# Patient Record
Sex: MALE | Race: WHITE | Employment: OTHER | URBAN - METROPOLITAN AREA
[De-identification: names, ages, dates, MRNs, and addresses within clinical notes are randomized per-mention and may not be internally consistent; named-entity substitution may affect disease eponyms.]

---

## 2017-02-21 PROCEDURE — 87086 URINE CULTURE/COLONY COUNT: CPT | Performed by: INTERNAL MEDICINE

## 2019-01-29 PROBLEM — M48.061 LUMBAR FORAMINAL STENOSIS: Status: ACTIVE | Noted: 2019-01-29

## 2019-01-29 PROBLEM — Z98.890 HISTORY OF LUMBAR SURGERY: Status: ACTIVE | Noted: 2019-01-29

## 2019-04-11 RX ORDER — TURMERIC 400 MG
1 CAPSULE ORAL DAILY
COMMUNITY
End: 2019-04-16

## 2019-04-16 PROBLEM — Z98.890 HISTORY OF LUMBAR SURGERY: Status: RESOLVED | Noted: 2019-01-29 | Resolved: 2019-04-16

## 2019-04-16 PROCEDURE — 81003 URINALYSIS AUTO W/O SCOPE: CPT | Performed by: INTERNAL MEDICINE

## 2019-04-24 ENCOUNTER — ANESTHESIA EVENT (OUTPATIENT)
Dept: SURGERY | Facility: HOSPITAL | Age: 74
DRG: 455 | End: 2019-04-24
Payer: MEDICARE

## 2019-04-24 ENCOUNTER — APPOINTMENT (OUTPATIENT)
Dept: GENERAL RADIOLOGY | Facility: HOSPITAL | Age: 74
DRG: 455 | End: 2019-04-24
Attending: ORTHOPAEDIC SURGERY
Payer: MEDICARE

## 2019-04-24 ENCOUNTER — HOSPITAL ENCOUNTER (INPATIENT)
Facility: HOSPITAL | Age: 74
LOS: 2 days | Discharge: HOME | DRG: 455 | End: 2019-04-26
Attending: ORTHOPAEDIC SURGERY | Admitting: ORTHOPAEDIC SURGERY
Payer: MEDICARE

## 2019-04-24 ENCOUNTER — ANESTHESIA (OUTPATIENT)
Dept: SURGERY | Facility: HOSPITAL | Age: 74
DRG: 455 | End: 2019-04-24
Payer: MEDICARE

## 2019-04-24 DIAGNOSIS — M41.86 OTHER FORM OF SCOLIOSIS OF LUMBAR SPINE: Primary | ICD-10-CM

## 2019-04-24 PROCEDURE — 95938 SOMATOSENSORY TESTING: CPT | Performed by: ORTHOPAEDIC SURGERY

## 2019-04-24 PROCEDURE — 0SG10A0 FUSION OF 2 OR MORE LUMBAR VERTEBRAL JOINTS WITH INTERBODY FUSION DEVICE, ANTERIOR APPROACH, ANTERIOR COLUMN, OPEN APPROACH: ICD-10-PCS | Performed by: ORTHOPAEDIC SURGERY

## 2019-04-24 PROCEDURE — 85027 COMPLETE CBC AUTOMATED: CPT | Performed by: PHYSICIAN ASSISTANT

## 2019-04-24 PROCEDURE — 95861 NEEDLE EMG 2 EXTREMITIES: CPT | Performed by: ORTHOPAEDIC SURGERY

## 2019-04-24 PROCEDURE — 4A11X4G MONITORING OF PERIPHERAL NERVOUS ELECTRICAL ACTIVITY, INTRAOPERATIVE, EXTERNAL APPROACH: ICD-10-PCS | Performed by: ORTHOPAEDIC SURGERY

## 2019-04-24 PROCEDURE — 95940 IONM IN OPERATNG ROOM 15 MIN: CPT | Performed by: ORTHOPAEDIC SURGERY

## 2019-04-24 PROCEDURE — 0SG1071 FUSION OF 2 OR MORE LUMBAR VERTEBRAL JOINTS WITH AUTOLOGOUS TISSUE SUBSTITUTE, POSTERIOR APPROACH, POSTERIOR COLUMN, OPEN APPROACH: ICD-10-PCS | Performed by: ORTHOPAEDIC SURGERY

## 2019-04-24 PROCEDURE — 07DS3ZZ EXTRACTION OF VERTEBRAL BONE MARROW, PERCUTANEOUS APPROACH: ICD-10-PCS | Performed by: ORTHOPAEDIC SURGERY

## 2019-04-24 PROCEDURE — 76000 FLUOROSCOPY <1 HR PHYS/QHP: CPT | Performed by: ORTHOPAEDIC SURGERY

## 2019-04-24 PROCEDURE — 0QN00ZZ RELEASE LUMBAR VERTEBRA, OPEN APPROACH: ICD-10-PCS | Performed by: ORTHOPAEDIC SURGERY

## 2019-04-24 DEVICE — RELINE MAS RED SCRW 6.5X50 2C: Type: IMPLANTABLE DEVICE | Status: FUNCTIONAL

## 2019-04-24 DEVICE — 5.6-MODULUS XLW 10X22X60 15: Type: IMPLANTABLE DEVICE | Site: BACK | Status: FUNCTIONAL

## 2019-04-24 DEVICE — BONE GRAFT KIT 7510400 INFUSE MEDIUM
Type: IMPLANTABLE DEVICE | Site: BACK | Status: FUNCTIONAL
Brand: INFUSE® BONE GRAFT

## 2019-04-24 DEVICE — 5.6-MODULUS XLW 12X22X60 15: Type: IMPLANTABLE DEVICE | Site: BACK | Status: FUNCTIONAL

## 2019-04-24 DEVICE — RELINE MAS COCR ROD 5.5X300 ST: Type: IMPLANTABLE DEVICE | Status: FUNCTIONAL

## 2019-04-24 DEVICE — RELINE LCK SCRW 5.5 OPEN TULIP: Type: IMPLANTABLE DEVICE | Status: FUNCTIONAL

## 2019-04-24 DEVICE — OSTEOCEL PRO LARGE BULK BUY: Type: IMPLANTABLE DEVICE | Site: BACK | Status: FUNCTIONAL

## 2019-04-24 DEVICE — 5.6-MODULUS XLW 12X22X55 15: Type: IMPLANTABLE DEVICE | Site: BACK | Status: FUNCTIONAL

## 2019-04-24 DEVICE — RELINE MAS RED SCREW 7.5X50 2C: Type: IMPLANTABLE DEVICE | Status: FUNCTIONAL

## 2019-04-24 RX ORDER — DIPHENHYDRAMINE HYDROCHLORIDE 50 MG/ML
25 INJECTION INTRAMUSCULAR; INTRAVENOUS EVERY 4 HOURS PRN
Status: DISCONTINUED | OUTPATIENT
Start: 2019-04-24 | End: 2019-04-26

## 2019-04-24 RX ORDER — OXYCODONE HYDROCHLORIDE 10 MG/1
10 TABLET ORAL EVERY 4 HOURS PRN
Status: DISCONTINUED | OUTPATIENT
Start: 2019-04-24 | End: 2019-04-25

## 2019-04-24 RX ORDER — SODIUM CHLORIDE, SODIUM LACTATE, POTASSIUM CHLORIDE, CALCIUM CHLORIDE 600; 310; 30; 20 MG/100ML; MG/100ML; MG/100ML; MG/100ML
INJECTION, SOLUTION INTRAVENOUS CONTINUOUS
Status: DISCONTINUED | OUTPATIENT
Start: 2019-04-24 | End: 2019-04-24 | Stop reason: HOSPADM

## 2019-04-24 RX ORDER — MIDAZOLAM HYDROCHLORIDE 1 MG/ML
INJECTION INTRAMUSCULAR; INTRAVENOUS
Status: COMPLETED
Start: 2019-04-24 | End: 2019-04-24

## 2019-04-24 RX ORDER — LATANOPROST 50 UG/ML
1 SOLUTION/ DROPS OPHTHALMIC NIGHTLY
Status: DISCONTINUED | OUTPATIENT
Start: 2019-04-24 | End: 2019-04-26

## 2019-04-24 RX ORDER — MORPHINE SULFATE 4 MG/ML
4 INJECTION, SOLUTION INTRAMUSCULAR; INTRAVENOUS EVERY 2 HOUR PRN
Status: DISCONTINUED | OUTPATIENT
Start: 2019-04-24 | End: 2019-04-26

## 2019-04-24 RX ORDER — MORPHINE SULFATE 4 MG/ML
2 INJECTION, SOLUTION INTRAMUSCULAR; INTRAVENOUS EVERY 2 HOUR PRN
Status: DISCONTINUED | OUTPATIENT
Start: 2019-04-24 | End: 2019-04-26

## 2019-04-24 RX ORDER — DEXAMETHASONE SODIUM PHOSPHATE 4 MG/ML
4 VIAL (ML) INJECTION AS NEEDED
Status: DISCONTINUED | OUTPATIENT
Start: 2019-04-24 | End: 2019-04-24 | Stop reason: HOSPADM

## 2019-04-24 RX ORDER — BUPIVACAINE HYDROCHLORIDE AND EPINEPHRINE 5; 5 MG/ML; UG/ML
INJECTION, SOLUTION EPIDURAL; INTRACAUDAL; PERINEURAL AS NEEDED
Status: DISCONTINUED | OUTPATIENT
Start: 2019-04-24 | End: 2019-04-24 | Stop reason: HOSPADM

## 2019-04-24 RX ORDER — NALOXONE HYDROCHLORIDE 0.4 MG/ML
80 INJECTION, SOLUTION INTRAMUSCULAR; INTRAVENOUS; SUBCUTANEOUS AS NEEDED
Status: DISCONTINUED | OUTPATIENT
Start: 2019-04-24 | End: 2019-04-24 | Stop reason: HOSPADM

## 2019-04-24 RX ORDER — MEPERIDINE HYDROCHLORIDE 25 MG/ML
12.5 INJECTION INTRAMUSCULAR; INTRAVENOUS; SUBCUTANEOUS AS NEEDED
Status: DISCONTINUED | OUTPATIENT
Start: 2019-04-24 | End: 2019-04-24 | Stop reason: HOSPADM

## 2019-04-24 RX ORDER — DIAZEPAM 5 MG/1
5 TABLET ORAL EVERY 6 HOURS PRN
Status: DISCONTINUED | OUTPATIENT
Start: 2019-04-24 | End: 2019-04-26

## 2019-04-24 RX ORDER — LATANOPROST 50 UG/ML
1 SOLUTION/ DROPS OPHTHALMIC NIGHTLY
COMMUNITY
Start: 2018-09-10 | End: 2020-09-30

## 2019-04-24 RX ORDER — ACETAMINOPHEN 500 MG
1000 TABLET ORAL ONCE
Status: DISCONTINUED | OUTPATIENT
Start: 2019-04-24 | End: 2019-04-24 | Stop reason: HOSPADM

## 2019-04-24 RX ORDER — DIPHENHYDRAMINE HCL 25 MG
25 CAPSULE ORAL EVERY 4 HOURS PRN
Status: DISCONTINUED | OUTPATIENT
Start: 2019-04-24 | End: 2019-04-26

## 2019-04-24 RX ORDER — DEXAMETHASONE SODIUM PHOSPHATE 10 MG/ML
10 INJECTION, SOLUTION INTRAMUSCULAR; INTRAVENOUS EVERY 8 HOURS
Status: COMPLETED | OUTPATIENT
Start: 2019-04-24 | End: 2019-04-25

## 2019-04-24 RX ORDER — MORPHINE SULFATE 15 MG/1
15 TABLET ORAL EVERY 4 HOURS PRN
Status: DISCONTINUED | OUTPATIENT
Start: 2019-04-24 | End: 2019-04-25

## 2019-04-24 RX ORDER — PANTOPRAZOLE SODIUM 40 MG/1
40 TABLET, DELAYED RELEASE ORAL
Status: DISCONTINUED | OUTPATIENT
Start: 2019-04-25 | End: 2019-04-26

## 2019-04-24 RX ORDER — POLYETHYLENE GLYCOL 3350 17 G/17G
17 POWDER, FOR SOLUTION ORAL DAILY PRN
Status: DISCONTINUED | OUTPATIENT
Start: 2019-04-24 | End: 2019-04-26

## 2019-04-24 RX ORDER — ONDANSETRON 2 MG/ML
4 INJECTION INTRAMUSCULAR; INTRAVENOUS EVERY 4 HOURS PRN
Status: ACTIVE | OUTPATIENT
Start: 2019-04-24 | End: 2019-04-25

## 2019-04-24 RX ORDER — MORPHINE SULFATE 4 MG/ML
1 INJECTION, SOLUTION INTRAMUSCULAR; INTRAVENOUS EVERY 2 HOUR PRN
Status: DISCONTINUED | OUTPATIENT
Start: 2019-04-24 | End: 2019-04-26

## 2019-04-24 RX ORDER — MIDAZOLAM HYDROCHLORIDE 1 MG/ML
1 INJECTION INTRAMUSCULAR; INTRAVENOUS EVERY 5 MIN PRN
Status: DISCONTINUED | OUTPATIENT
Start: 2019-04-24 | End: 2019-04-24 | Stop reason: HOSPADM

## 2019-04-24 RX ORDER — OXYCODONE HYDROCHLORIDE 5 MG/1
5 TABLET ORAL EVERY 4 HOURS PRN
Status: DISCONTINUED | OUTPATIENT
Start: 2019-04-24 | End: 2019-04-25

## 2019-04-24 RX ORDER — METOCLOPRAMIDE HYDROCHLORIDE 5 MG/ML
10 INJECTION INTRAMUSCULAR; INTRAVENOUS EVERY 6 HOURS PRN
Status: DISCONTINUED | OUTPATIENT
Start: 2019-04-24 | End: 2019-04-26

## 2019-04-24 RX ORDER — BISACODYL 10 MG
10 SUPPOSITORY, RECTAL RECTAL
Status: DISCONTINUED | OUTPATIENT
Start: 2019-04-24 | End: 2019-04-26

## 2019-04-24 RX ORDER — ONDANSETRON 2 MG/ML
4 INJECTION INTRAMUSCULAR; INTRAVENOUS AS NEEDED
Status: DISCONTINUED | OUTPATIENT
Start: 2019-04-24 | End: 2019-04-24 | Stop reason: HOSPADM

## 2019-04-24 RX ORDER — TIZANIDINE 2 MG/1
2 TABLET ORAL 3 TIMES DAILY PRN
Status: DISCONTINUED | OUTPATIENT
Start: 2019-04-24 | End: 2019-04-26

## 2019-04-24 RX ORDER — SODIUM CHLORIDE, SODIUM LACTATE, POTASSIUM CHLORIDE, CALCIUM CHLORIDE 600; 310; 30; 20 MG/100ML; MG/100ML; MG/100ML; MG/100ML
INJECTION, SOLUTION INTRAVENOUS CONTINUOUS
Status: DISCONTINUED | OUTPATIENT
Start: 2019-04-24 | End: 2019-04-24

## 2019-04-24 RX ORDER — ONDANSETRON 2 MG/ML
4 INJECTION INTRAMUSCULAR; INTRAVENOUS ONCE
Status: COMPLETED | OUTPATIENT
Start: 2019-04-24 | End: 2019-04-24

## 2019-04-24 RX ORDER — HYDROMORPHONE HYDROCHLORIDE 1 MG/ML
INJECTION, SOLUTION INTRAMUSCULAR; INTRAVENOUS; SUBCUTANEOUS
Status: COMPLETED
Start: 2019-04-24 | End: 2019-04-24

## 2019-04-24 RX ORDER — BACITRACIN 50000 [USP'U]/1
INJECTION, POWDER, LYOPHILIZED, FOR SOLUTION INTRAMUSCULAR AS NEEDED
Status: DISCONTINUED | OUTPATIENT
Start: 2019-04-24 | End: 2019-04-24 | Stop reason: HOSPADM

## 2019-04-24 RX ORDER — HYDROMORPHONE HYDROCHLORIDE 1 MG/ML
0.4 INJECTION, SOLUTION INTRAMUSCULAR; INTRAVENOUS; SUBCUTANEOUS EVERY 5 MIN PRN
Status: DISCONTINUED | OUTPATIENT
Start: 2019-04-24 | End: 2019-04-24 | Stop reason: HOSPADM

## 2019-04-24 RX ORDER — GABAPENTIN 600 MG/1
600 TABLET ORAL ONCE
Status: COMPLETED | OUTPATIENT
Start: 2019-04-24 | End: 2019-04-24

## 2019-04-24 RX ORDER — SODIUM CHLORIDE 9 MG/ML
INJECTION, SOLUTION INTRAVENOUS CONTINUOUS
Status: DISCONTINUED | OUTPATIENT
Start: 2019-04-24 | End: 2019-04-26

## 2019-04-24 RX ORDER — LEVOTHYROXINE SODIUM 88 UG/1
88 TABLET ORAL
Status: DISCONTINUED | OUTPATIENT
Start: 2019-04-25 | End: 2019-04-26

## 2019-04-24 RX ORDER — SODIUM PHOSPHATE, DIBASIC AND SODIUM PHOSPHATE, MONOBASIC 7; 19 G/133ML; G/133ML
1 ENEMA RECTAL ONCE AS NEEDED
Status: DISCONTINUED | OUTPATIENT
Start: 2019-04-24 | End: 2019-04-26

## 2019-04-24 NOTE — ANESTHESIA POSTPROCEDURE EVALUATION
[address] [facility] Patient Status:  Surgery Admit - Inpt   Age/Gender 75year old male MRN [id number]   [facility] SURGERY Attending Oscar Mcfarlane MD   Hosp Day # 0 PCP Rona Mendoza MD       Anesthesia Post-op Note    Procedure

## 2019-04-24 NOTE — PROGRESS NOTES
S: Moderate back pain with no leg pain. No numbness or weakness. No headaches. Inspection:  Awake alert No acute distress. No difficulty breathing     Blood pressure 149/90, pulse 73, temperature 98.4 °F (36.9 °C), temperature source Oral, resp.  rat

## 2019-04-24 NOTE — CONSULTS
General Medicine Consult      Reason for consult: post-op medical management     Consulted by: Dr. Ashley Seth    PCP: Lisa Sanders MD      History of Present Illness: Patient is a 75year old male with PMH sig for hypothyroidism, GERD presented for planned L2-L5 [DISCONTINUED] NON FORMULARY Take 3 tablets by mouth daily. Brain Boost and Renewal Disp:  Rfl:    [DISCONTINUED] NON FORMULARY Take 3 tablets by mouth daily.  Joint support Disp:  Rfl:    Levothyroxine Sodium 88 MCG Oral Tab Take 1 tablet (88 mcg total) BMI 30.28 kg/m²     Wt Readings from Last 6 Encounters:  04/24/19 : 205 lb 0.4 oz (93 kg)  04/19/19 : 212 lb 12.8 oz (96.5 kg)  04/16/19 : 210 lb 3.2 oz (95.3 kg)  04/09/19 : 195 lb (88.5 kg)  01/29/19 : 195 lb (88.5 kg)  01/11/19 : 206 lb 6.4 oz (93.6 kg) details.      Dictated by: So Glynn MD on 4/24/2019 at 12:35     Approved by: So Glynn MD                 ASSESSMENT / PLAN:    # s/p L2-L5 decompression/fusion  - ortho spine managing  - PT/OT    # Post-op pain   - Transition to PO pain m

## 2019-04-24 NOTE — BRIEF OP NOTE
Pre-Operative Diagnosis: Other form of scoliosis of lumbar spine [M41.86]     Post-Operative Diagnosis: same      Procedure Performed:   Procedure(s):  Lumbar 2-Lumbar 3, Lumbar 3-Lumbar 4,  Lumbar 4-Lumbar 5 anterolateral fusion with Lumbar 2-Lumbar 3, Amy

## 2019-04-24 NOTE — ANESTHESIA PREPROCEDURE EVALUATION
PRE-OP EVALUATION    Patient Name: Christine Barrera    Pre-op Diagnosis: Other form of scoliosis of lumbar spine [M41.86]    Procedure(s):  Lumbar 2-Lumbar 3, Lumbar 3-Lumbar 4,  Lumbar 4-Lumbar 5 anterolateral fusion with Lumbar 2-Lumbar 3, Lumbar 3- Lumbar (ACETAMINOPHEN EXTRA STRENGTH) 500 MG Oral Cap Take 1,000 mg by mouth one time. Disp:  Rfl:    [DISCONTINUED] NON FORMULARY Take 1 capsule by mouth daily.  Isagenix complete essentials daily pack Disp:  Rfl:    [DISCONTINUED] BOSWELLIA-GLUCOSAMINE-VIT D OR HISTORY      left knee scope   • TOTAL KNEE REPLACEMENT Left 2011   • TRANSFORAMINAL EPIDURAL - LUMBAR N/A 2/27/2019    Performed by Ramona Joseph MD at [address]   • TRANSFORAMINAL EPIDURAL - LUMBAR N/A 2/6/2019    Performed by Sheryle Kendall

## 2019-04-24 NOTE — INTERVAL H&P NOTE
Pre-op Diagnosis: Other form of scoliosis of lumbar spine [M41.86]    The above referenced H&P was reviewed by Quique Carter MD on 4/24/2019, the patient was examined and no significant changes have occurred in the patient's condition since the H&P was pe

## 2019-04-25 PROCEDURE — 97165 OT EVAL LOW COMPLEX 30 MIN: CPT

## 2019-04-25 PROCEDURE — 97535 SELF CARE MNGMENT TRAINING: CPT

## 2019-04-25 PROCEDURE — 97161 PT EVAL LOW COMPLEX 20 MIN: CPT

## 2019-04-25 PROCEDURE — 97530 THERAPEUTIC ACTIVITIES: CPT

## 2019-04-25 RX ORDER — HYDROCODONE BITARTRATE AND ACETAMINOPHEN 10; 325 MG/1; MG/1
1 TABLET ORAL EVERY 6 HOURS PRN
Status: DISCONTINUED | OUTPATIENT
Start: 2019-04-25 | End: 2019-04-26

## 2019-04-25 RX ORDER — ACETAMINOPHEN 500 MG
TABLET ORAL EVERY 6 HOURS PRN
Status: DISCONTINUED | OUTPATIENT
Start: 2019-04-25 | End: 2019-04-26

## 2019-04-25 RX ORDER — HYDROCODONE BITARTRATE AND ACETAMINOPHEN 10; 325 MG/1; MG/1
2 TABLET ORAL EVERY 6 HOURS PRN
Status: DISCONTINUED | OUTPATIENT
Start: 2019-04-25 | End: 2019-04-26

## 2019-04-25 NOTE — PLAN OF CARE
Lower back with microfoam  and right flank area with gauze and tegaderm dressing in place. Both dressing are dry and intact. He is up in the chair and ambulating using a walker with chair back brace on.   Reinforced instructions not to get up by himself,

## 2019-04-25 NOTE — PROGRESS NOTES
Patient and   attended discharge spine education class. Printed discharge education sheet provided and reviewed. Teach back done. Questions solicited and answered. Tolerated activity well.

## 2019-04-25 NOTE — OCCUPATIONAL THERAPY NOTE
OCCUPATIONAL THERAPY QUICK EVALUATION - INPATIENT    Room Number: 377/377-A  Evaluation Date: 4/25/2019     Type of Evaluation: Quick Eval  Presenting Problem: L2-L5 anterolateral fusion w/ L2-L5 posterior decompression and fusion    Physician Order: Brigid Cesar shower;Grab bar  Other Equipment: Hip kit    Occupation/Status: retired ()     Drives: Yes  Patient Regularly Uses: Glasses    Prior Level of Function: Patient reports independent in all I/ADL and functional mobility without device vamshi independent    Skilled Therapy Provided: Patient received sitting up in chair at the beginning of the session.  Activities performed this session include: Education on spinal precautions, body mechanics, back protection principles, and incorporation into AD to I/ADL tasks. No further OT services needed at this time.      Patient Complexity  Occupational Profile/Medical History  LOW - Brief history including review of medical or therapy records    Specific performance deficits impacting engagement in ADL/IADL

## 2019-04-25 NOTE — PHYSICAL THERAPY NOTE
PHYSICAL THERAPY QUICK EVALUATION - INPATIENT    Room Number: 377/377-A  Evaluation Date: 4/25/2019  Presenting Problem: S/p L2-L3,L3-L4,L4-L5 Anterolateral Fusion with L2-L3,L3-L4,L4-L5 Posterior decompresion & fusion on 04/24/19  Physician Order: TERESO Amaya session.     OBJECTIVE  Precautions: Spine;Lumbar brace  Fall Risk: Standard fall risk    WEIGHT BEARING RESTRICTION  Weight Bearing Restriction: None                PAIN ASSESSMENT  Ratin  Location: Back @ [facility] site, Left knee pain - minimal to none & doffing technique for chair back brace, Patient demonstrated independence to don & doff brace. Cleaning techniques for brace reviewed. Patient was able to demonstrate practice of proper body mechanics during independent bed mobility, functional transfers

## 2019-04-25 NOTE — PLAN OF CARE
A&Ox4. Pain well controlled with PO prn pain meds. Denies N/T. Tolerated well ambulating in hallway with min assist and walker. Chair back brace applied when OOB. Teds and scds on. VSS. On RA. Winter catheter draining clear, yellow urine. Updated on POC.  Wi

## 2019-04-25 NOTE — PROGRESS NOTES
S: Moderate back pain with mild right anterior thigh pain. Patient has no new numbness. No weakness. No headaches. No other concerns. He has been up and walking around. Inspection:  Awake alert No acute distress.  No difficulty breathing     Blood pre

## 2019-04-26 VITALS
HEART RATE: 103 BPM | WEIGHT: 205 LBS | DIASTOLIC BLOOD PRESSURE: 87 MMHG | RESPIRATION RATE: 18 BRPM | TEMPERATURE: 98 F | HEIGHT: 69 IN | BODY MASS INDEX: 30.36 KG/M2 | SYSTOLIC BLOOD PRESSURE: 160 MMHG | OXYGEN SATURATION: 92 %

## 2019-04-26 NOTE — PROGRESS NOTES
S: Minimal back pain and just taking Tylenol. Patient has no leg pain, numbness or weakness. No headaches. + flatus , but no BM yet. Inspection:  Awake alert No acute distress.  No difficulty breathing     Blood pressure 150/86, pulse 103, temperature

## 2019-04-26 NOTE — PLAN OF CARE
A&O x 4. VSS. On RA. LBP well controlled with PRN pain meds. Denies N/T to BLE's. Microfoam dressing to lower back and gauze/tegaderm dressing to flank C/D/I. Chairback brace when OOB. Up with stand by assist ambulating to bathroom and in hallway.  Voiding

## 2019-04-26 NOTE — PROGRESS NOTES
Assumed care at midnight,V/S stable,no numbness or tingling sensation to both lower extremities. BAck dressing dry and intact. Ambulating in the hallway with chairback brace and standby assist.Pain well controlled with oral pain medication. Discharge plan is

## 2019-04-26 NOTE — PLAN OF CARE
Problem: Patient/Family Goals  Goal: Patient/Family Long Term Goal  Description  Patient's Long Term Goal: Pt will be able to go back to the gym in the summer.     Interventions:  - Pt will increase activity at home & increase distance of walking while ma encourage patient to participate in ADLs to maximize function  - Promote sitting position while performing ADLs such as feeding, grooming, and bathing  - Educate and encourage patient/family in tolerated functional activity level and precautions during jose eduardo

## 2019-04-29 NOTE — OPERATIVE REPORT
[facility]    PATIENT'S NAME: Cony Donis   ATTENDING PHYSICIAN: Vamsi Sun M.D. OPERATING PHYSICIAN: Vamsi Sun M.D.    PATIENT ACCOUNT#:   [de-identified]    LOCATION:  [address] [place] [address]  MEDICAL RECORD #:   [id number]       DATE OF BIRTH:  [birth year] draped. A posterior incision was first made one finger length posterior to the lateral incision. This was done with a #10 blade. Bovie electrocautery was used for hemostasis. Dissection was taken down to the level of the fascia.  Our attention first tur impacted into position. Any remaining psoas material was dissected anteriorly; the retractor was widened. AP and lateral x-ray confirmed appropriate localization of our retractor.   We then performed an annulotomy with a bayoneted blade using a bayoneted next level, being L3-4.   Using the same methods using dilators, a posterior incision and the dilators being guided directly under psoas, using live neuromonitoring and with minimally invasive retractor, we were able to repeat the same steps as outlined abo confirmed 4 out of 4 twitches with our colleagues and neuromonitoring. Under fluoroscopic guidance, we targeted the lateral aspect of the pedicles bilaterally.   These I-PAS needles were then impacted medially and then checked under lateral fluoroscopy for elements were not harmed during this process. The osteotomy facilitated reduction of the patient's kyphosis by allowing for compression via pedicle screws at the conclusion of the case. A bur was used to remove additional bone.   We identified the later muscle in the paraspinal plane, and an articulating arm was attached to the retractor. Bovie electrocautery was used to remove remaining tissue over the pars and facet.   Pre-operative measurements documenting a mismatch between pelvic incidence and lumbar same steps. The retractor apparatus was placed over the hoop shims. A medial retractor was sized and applied.   All tissues were retracted out of harm's way with regards to muscle in the paraspinal plane, and an articulating arm was attached to the retrac This process was required to complete the necessary decompression of nerve roots at this level.     Utilizing the incisions and fascial openings previously utilized for the osteotomy and decompression we proceeded with a posterolateral fusion at the L2-3, 3 was applied. Steri-Strips were applied. Sterile dressings were applied. Needle and Ray-Shahida counts were correct at the conclusion of the case.     Dictated By Yovanny Escobar M.D.  d: 04/28/2019 20:57:07  t: 04/28/2019 21:25:15  [facility] [id number]  PRP/C

## 2019-09-12 PROBLEM — M41.86 OTHER FORM OF SCOLIOSIS OF LUMBAR SPINE: Status: RESOLVED | Noted: 2019-04-24 | Resolved: 2019-09-12

## 2019-11-01 PROBLEM — M25.512 LEFT SHOULDER PAIN: Status: ACTIVE | Noted: 2019-11-01

## (undated) DEVICE — SOL  .9 1000ML BTL

## (undated) DEVICE — DERMABOND LIQUID ADHESIVE

## (undated) DEVICE — NVM5 XLIF ACTIVATOR ELEC KIT

## (undated) DEVICE — SUTURE VICRYL 1 OS-6

## (undated) DEVICE — 3M™ MICROFOAM™ TAPE 1528-4: Brand: 3M™ MICROFOAM™

## (undated) DEVICE — Device

## (undated) DEVICE — LAMINECTOMY ARM CRADLE FOAM POSITIONER: Brand: CARDINAL HEALTH

## (undated) DEVICE — MAXCESS MAS TLIF 2 KIT ACCESS

## (undated) DEVICE — STERILE POLYISOPRENE POWDER-FREE SURGICAL GLOVES: Brand: PROTEXIS

## (undated) DEVICE — ROD BENDING DIGITIZER ARRAY

## (undated) DEVICE — 3.0MM PRECISION ROUND

## (undated) DEVICE — KENDALL SCD EXPRESS SLEEVES, THIGH LENGTH, MEDIUM: Brand: KENDALL SCD

## (undated) DEVICE — KIT SPNL XLIF NRVSN DIL M5

## (undated) DEVICE — DRILL SRG OIL CRTDG MAESTRO

## (undated) DEVICE — COVER,MAYO STAND,STERILE: Brand: MEDLINE

## (undated) DEVICE — FLOSEAL HEMOSTATIC MATRIX, 5ML: Brand: FLOSEAL HEMOSTATIC MATRIX

## (undated) DEVICE — RELINE POWER

## (undated) DEVICE — UNDYED BRAIDED (POLYGLACTIN 910), SYNTHETIC ABSORBABLE SUTURE: Brand: COATED VICRYL

## (undated) DEVICE — C-ARMOR C-ARM EQUIPMENT COVERS CLEAR STERILE UNIVERSAL FIT 12 PER CASE: Brand: C-ARMOR

## (undated) DEVICE — NUVAMAP O.R. TECHNOLOGY

## (undated) DEVICE — LAMINECTOMY CDS: Brand: MEDLINE INDUSTRIES, INC.

## (undated) DEVICE — 11.1-M5 MULTIMODALITY KIT 5

## (undated) DEVICE — WIRE FX PRCPT KRSH BVL BLNT

## (undated) DEVICE — TRANSPOSAL ULTRAFLEX DUO/QUAD ULTRA CART MANIFOLD

## (undated) DEVICE — WRAP COOLING BACK W/NO PILLOW

## (undated) DEVICE — DRAPE C-ARM UNIVERSAL

## (undated) DEVICE — DRAPE,T,LAPARO,TRANS,STERILE: Brand: MEDLINE

## (undated) DEVICE — SUTURE VICRYL 2-0 FSL

## (undated) DEVICE — LIGHT HANDLE

## (undated) DEVICE — DRAPE TABLE COVER 44X90 TC-10

## (undated) NOTE — LETTER
[facility]  Phone: [phone]  Right Fax: [phone]  [address] [place] [address] By:  Alla Haley RN    Date: 4/11/19    Patient Name: Gaynel Habermann  Surgery Date: 4/24/2019    CSN: [id number]  Medical Record: [id number] return the original documents to us at the address listed above.                                                                                                                                                     Rev 2/12/14

## (undated) NOTE — IP AVS SNAPSHOT
[address]            (For Outpatient Use Only) Initial Admit Date: 4/24/2019   Inpt/Obs Admit Date: Inpt: 4/24/19 / Obs: N/A   Discharge Date:    Dorina Marrero:  [de-identified]   MRN: [de-identified]   CSN: [id number]   CEID: [id number] Group Number:  Insurance Type:    Subscriber Name:  Subscriber :    Subscriber ID:  Pt Rel to Subscriber:    Hospital Account Financial Class: Medicare    2019

## (undated) NOTE — IP AVS SNAPSHOT
Patient Demographics     Address  [address] [place] [address] [address] [place] [id number] Phone  [phone] [facility]  [phone] [facility] E-mail Address  [email or web address]      Emergency Contact(s)     Name Relation Home Work [address] [place] [address],Third Floor Sp ? Do Not drive while taking narcotics or muscle relaxants. ? Adhere to sitting restrictions. Stairs  ? Climb stairs as needed      IF you have a Brace / Corset  ? Use when out of bed except when showering. ? May wear even when toileting. ?  Anticipate ? May take off at night and for bathing. Hand wash with mild soap; line dry. Diet and constipation prevention  ? Drink six to eight glasses liquid (water, juice) per day. ? Eat a high fiber diet (bran, vegetables, fruit). ?  Use an over the counter sto ? Schedule 2 weeks after surgery with surgeon as directed at discharge  ? Schedule one week follow up with Primary Care Physician. Review all medications. When to contact your surgeon  ? Temp > 101F; Take your temperature twice a day  ?  Increased pain [id number] acetaminophen (TYLENOL EXTRA STRENGTH) tab 500-1,000 mg 04/25/19 2045 Given      [id number] acetaminophen (TYLENOL EXTRA STRENGTH) tab 500-1,000 mg 04/26/19 0647 Given      [id number] benzocaine-menthol (CEPACOL (SUGAR-FREE)) 1 lozenge 04/25/19 19 Past Medical History:   Diagnosis Date   • BACK PAIN    • BPH    • HYPOTHYROIDISM[SB.2]         PSH:[SB.1]  Past Surgical History:   Procedure Laterality Date   • APPENDECTOMY     • BACK SURGERY     • COLONOSCOPY, POSSIBLE BIOPSY, POSSIBLE POLYPECTOMY 8873 [DISCONTINUED] Aspirin 81 MG Oral Tab 1 TABLET DAILY Disp:  Rfl:[SB.2]        Scheduled Medication:[SB.1]  • vancomycin  15 mg/kg Intravenous Once   • dexamethasone PF  10 mg Intravenous Q8H   • latanoprost  1 drop Both Eyes Nightly   • [START ON 4/25/2019 GI: soft/NT/ND +BS  Ext: nonedematous b/l LE, warm to touch, no clubbing or cyanosis  Neuro: moving all extremities  Psych: normal mood, normal affect  Skin: no rashes, no lesions, with exception of post-op dressing in place/c/d/i      Diagnostics:   CBC/C - Continue PPI    Check Hgb in AM to monitor for acute blood loss anemia. Outpatient records or previous hospital records reviewed. Appreciate this consultation. [facility] hospitalist to continue to follow patient while in house.  Please call with any ques • APPENDECTOMY     • BACK SURGERY     • COLONOSCOPY, POSSIBLE BIOPSY, POSSIBLE POLYPECTOMY 35642 N/A 8/12/2011    Performed by Trung Floyd MD at [facility]   • OTHER SURGICAL HISTORY      achilles tendon repair   • OTHER SURGICAL HISTO Lower extremity strength is within functional limits     NEUROLOGICAL FINDINGS  Neurological Findings: None                   ACTIVITY TOLERANCE                         O2 WALK                  AM-PAC '6-Clicks' INPATIENT SHORT FORM - BASIC MOBILITY  How m within reach;RN aware of session/findings; All patient questions and concerns addressed;SCDs in place; Family present; Discussed recommendations with /    ASSESSMENT   Patient is a 75year old male admitted on 4/24/2019 for S/p L2-L3, Reason for Therapy:  ADL/IADL Dysfunction and Discharge Planning    History related to current admission: Patient is a 75year old male s/p L2-L5 anterolateral fusion with L2-L5 posterior decompression and fusion. PHM includes L TKA (2011).     Problem List that during the past year, he was unable to stand for more than 5 minutes at a time d/t severe radiating L knee pain. SUBJECTIVE   \"I'm really happy with my left knee now.  I've done sports all my life and haven't been active for the last year or so required minimal cueing to follow throughout session; education on bed mobility including logroll technique, performed Mod I; education on LB dressing techniques/equipment, donned socks via figure-4 technique, performed LB dressing to knees via supervision performance deficits    Client Assessment/Performance Deficits  LOW - No comorbidities nor modifications of tasks    Clinical Decision Making  LOW - Analysis of occupational profile, problem-focused assessments, limited treatment options    Overall Complex